# Patient Record
Sex: FEMALE | URBAN - METROPOLITAN AREA
[De-identification: names, ages, dates, MRNs, and addresses within clinical notes are randomized per-mention and may not be internally consistent; named-entity substitution may affect disease eponyms.]

---

## 2024-02-15 ENCOUNTER — APPOINTMENT (OUTPATIENT)
Dept: URBAN - METROPOLITAN AREA CLINIC 189 | Age: 69
Setting detail: DERMATOLOGY
End: 2024-02-19

## 2024-02-15 DIAGNOSIS — L20.89 OTHER ATOPIC DERMATITIS: ICD-10-CM

## 2024-02-15 PROCEDURE — OTHER COUNSELING: OTHER

## 2024-02-15 PROCEDURE — 99203 OFFICE O/P NEW LOW 30 MIN: CPT

## 2024-02-15 PROCEDURE — OTHER PRESCRIPTION: OTHER

## 2024-02-15 PROCEDURE — OTHER TREATMENT REGIMEN: OTHER

## 2024-02-15 PROCEDURE — OTHER MIPS QUALITY: OTHER

## 2024-02-15 RX ORDER — CLOBETASOL PROPIONATE 0.5 MG/ML
SOLUTION TOPICAL
Qty: 50 | Refills: 1 | Status: ERX | COMMUNITY
Start: 2024-02-15

## 2024-02-15 RX ORDER — CLOBETASOL PROPIONATE 0.5 MG/G
OINTMENT TOPICAL
Qty: 45 | Refills: 1 | Status: ERX | COMMUNITY
Start: 2024-02-15

## 2024-02-15 ASSESSMENT — LOCATION DETAILED DESCRIPTION DERM
LOCATION DETAILED: RIGHT RADIAL PALM
LOCATION DETAILED: LEFT RADIAL PALM

## 2024-02-15 ASSESSMENT — LOCATION ZONE DERM: LOCATION ZONE: HAND

## 2024-02-15 ASSESSMENT — LOCATION SIMPLE DESCRIPTION DERM
LOCATION SIMPLE: RIGHT HAND
LOCATION SIMPLE: LEFT HAND

## 2024-02-15 NOTE — PROCEDURE: MIPS QUALITY
Detail Level: Detailed
Quality 226: Preventive Care And Screening: Tobacco Use: Screening And Cessation Intervention: Patient screened for tobacco use and is an ex/non-smoker
Quality 47: Advance Care Plan: Advance Care Planning discussed and documented; advance care plan or surrogate decision maker documented in the medical record.
Quality 111:Pneumonia Vaccination Status For Older Adults: Patient received any pneumococcal conjugate or polysaccharide vaccine on or after their 60th birthday and before the end of the measurement period

## 2024-02-15 NOTE — PROCEDURE: TREATMENT REGIMEN
Otc Regimen: Use Cetaphil gentle cleanser or dove sensitive skin bar soap. Patient advised to avoid fragrances in soaps and lotions. Patient to moisturize after every hand washing. List of approved products provided.
Detail Level: Simple

## 2024-02-15 NOTE — HPI: ECZEMA (PATIENT REPORTED)
Where Is Your Eczema Located?: Both hands
List Moisturizers You Tried (Separate Each Name With A Comma):: Eucerin, CeraVe
List Prescription Topical Steroids You Tried (Separate Each Name With A Comma):: A steroid that she used for poison Ivy and for her eyebrows.
List Moisturizers You Are Currently Using (Separate Each Name With A Comma):: CeraVe for eczema
Additional Comments (Use Complete Sentences): Patient was diagnosed by her pcp but was never given a treatment for eczema.

## 2024-03-20 ENCOUNTER — APPOINTMENT (OUTPATIENT)
Dept: URBAN - METROPOLITAN AREA CLINIC 189 | Age: 69
Setting detail: DERMATOLOGY
End: 2024-03-20

## 2024-03-20 DIAGNOSIS — L20.89 OTHER ATOPIC DERMATITIS: ICD-10-CM

## 2024-03-20 PROCEDURE — OTHER TREATMENT REGIMEN: OTHER

## 2024-03-20 PROCEDURE — 99213 OFFICE O/P EST LOW 20 MIN: CPT

## 2024-03-20 PROCEDURE — OTHER MIPS QUALITY: OTHER

## 2024-03-20 PROCEDURE — OTHER COUNSELING: OTHER

## 2024-03-20 ASSESSMENT — LOCATION DETAILED DESCRIPTION DERM
LOCATION DETAILED: RIGHT RADIAL PALM
LOCATION DETAILED: LEFT RADIAL PALM

## 2024-03-20 ASSESSMENT — LOCATION SIMPLE DESCRIPTION DERM
LOCATION SIMPLE: RIGHT HAND
LOCATION SIMPLE: LEFT HAND

## 2024-03-20 ASSESSMENT — LOCATION ZONE DERM: LOCATION ZONE: HAND

## 2024-03-20 NOTE — PROCEDURE: TREATMENT REGIMEN
Continue Regimen: Clobetasol ointment and solution as previously directed
Detail Level: Simple
Otc Regimen: Try paraffin wax bath treatment three times weekly.\\nContinue fragrance free gentle soaps and moisturizer after hand washing.

## 2024-05-01 ENCOUNTER — APPOINTMENT (OUTPATIENT)
Dept: URBAN - METROPOLITAN AREA CLINIC 189 | Age: 69
Setting detail: DERMATOLOGY
End: 2024-05-01

## 2024-05-01 DIAGNOSIS — L20.89 OTHER ATOPIC DERMATITIS: ICD-10-CM

## 2024-05-01 PROBLEM — L30.9 DERMATITIS, UNSPECIFIED: Status: ACTIVE | Noted: 2024-05-01

## 2024-05-01 PROCEDURE — OTHER MIPS QUALITY: OTHER

## 2024-05-01 PROCEDURE — 99213 OFFICE O/P EST LOW 20 MIN: CPT

## 2024-05-01 PROCEDURE — OTHER TREATMENT REGIMEN: OTHER

## 2024-05-01 PROCEDURE — OTHER COUNSELING: OTHER

## 2024-05-01 ASSESSMENT — LOCATION SIMPLE DESCRIPTION DERM
LOCATION SIMPLE: PLANTAR SURFACE OF LEFT 1ST TOE
LOCATION SIMPLE: LEFT HAND
LOCATION SIMPLE: RIGHT HAND

## 2024-05-01 ASSESSMENT — LOCATION DETAILED DESCRIPTION DERM
LOCATION DETAILED: RIGHT RADIAL PALM
LOCATION DETAILED: LEFT RADIAL PALM
LOCATION DETAILED: LEFT LATERAL PLANTAR 1ST TOE

## 2024-05-01 ASSESSMENT — LOCATION ZONE DERM
LOCATION ZONE: TOE
LOCATION ZONE: HAND

## 2024-05-01 NOTE — PROCEDURE: TREATMENT REGIMEN
Plan: Vtama risks vs benefits discussed.
Samples Given: Cicaplast\\nVtama
Detail Level: Simple
Otc Regimen: Continue fragrance free gentle soaps and moisturizer after hand washing. Try moisturizing with Cicaplast.
Discontinue Regimen: Clobetasol ointment and solution
Initiate Treatment: Vtama-apply nightly specifically to affected areas until clear.

## 2024-06-05 ENCOUNTER — APPOINTMENT (OUTPATIENT)
Dept: URBAN - METROPOLITAN AREA CLINIC 189 | Age: 69
Setting detail: DERMATOLOGY
End: 2024-06-05

## 2024-06-05 DIAGNOSIS — L20.89 OTHER ATOPIC DERMATITIS: ICD-10-CM

## 2024-06-05 PROCEDURE — OTHER TREATMENT REGIMEN: OTHER

## 2024-06-05 PROCEDURE — OTHER COUNSELING: OTHER

## 2024-06-05 PROCEDURE — OTHER MIPS QUALITY: OTHER

## 2024-06-05 PROCEDURE — 99213 OFFICE O/P EST LOW 20 MIN: CPT

## 2024-06-05 ASSESSMENT — LOCATION ZONE DERM
LOCATION ZONE: TOE
LOCATION ZONE: FEET
LOCATION ZONE: HAND

## 2024-06-05 ASSESSMENT — ITCH NUMERIC RATING SCALE: HOW SEVERE IS YOUR ITCHING?: 3

## 2024-06-05 ASSESSMENT — LOCATION DETAILED DESCRIPTION DERM
LOCATION DETAILED: LEFT RADIAL PALM
LOCATION DETAILED: LEFT MEDIAL PLANTAR 1ST TOE
LOCATION DETAILED: RIGHT RADIAL PALM
LOCATION DETAILED: RIGHT MEDIAL PLANTAR HEEL
LOCATION DETAILED: LEFT MEDIAL PLANTAR HEEL

## 2024-06-05 ASSESSMENT — SEVERITY ASSESSMENT 2020: SEVERITY 2020: MODERATE

## 2024-06-05 ASSESSMENT — LOCATION SIMPLE DESCRIPTION DERM
LOCATION SIMPLE: RIGHT PLANTAR SURFACE
LOCATION SIMPLE: PLANTAR SURFACE OF LEFT 1ST TOE
LOCATION SIMPLE: LEFT HAND
LOCATION SIMPLE: LEFT PLANTAR SURFACE
LOCATION SIMPLE: RIGHT HAND

## 2024-06-05 ASSESSMENT — BSA ECZEMA: % BODY COVERED IN ECZEMA: 3

## 2024-06-05 NOTE — PROCEDURE: TREATMENT REGIMEN
Initiate Treatment: Vtama nightly followed by Clobetasol ointment.\\nClobetasol solution in the morning.
Otc Regimen: Continue frequent moisturizing
Plan: Hyperkeratotic Eczema vs. PS\\nI’m leaning more towards eczema and recommend trial of Dupixent due to more favorable safety profile and no lab monitoring. 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 \\n\\nDupixent enrollment form will be sent to [facility] and Dupixent [employer].
Detail Level: Simple

## 2024-08-14 ENCOUNTER — APPOINTMENT (OUTPATIENT)
Dept: URBAN - METROPOLITAN AREA CLINIC 189 | Age: 69
Setting detail: DERMATOLOGY
End: 2024-08-16

## 2024-08-14 DIAGNOSIS — L20.89 OTHER ATOPIC DERMATITIS: ICD-10-CM

## 2024-08-14 PROCEDURE — 99213 OFFICE O/P EST LOW 20 MIN: CPT | Mod: 25

## 2024-08-14 PROCEDURE — OTHER TREATMENT REGIMEN: OTHER

## 2024-08-14 PROCEDURE — OTHER DUPIXENT INJECTION: OTHER

## 2024-08-14 PROCEDURE — OTHER SEPARATE AND IDENTIFIABLE DOCUMENTATION: OTHER

## 2024-08-14 PROCEDURE — OTHER COUNSELING: OTHER

## 2024-08-14 PROCEDURE — OTHER MIPS QUALITY: OTHER

## 2024-08-14 PROCEDURE — 96372 THER/PROPH/DIAG INJ SC/IM: CPT

## 2024-08-14 ASSESSMENT — LOCATION SIMPLE DESCRIPTION DERM
LOCATION SIMPLE: RIGHT PLANTAR SURFACE
LOCATION SIMPLE: RIGHT HAND
LOCATION SIMPLE: LEFT PLANTAR SURFACE
LOCATION SIMPLE: LEFT HAND
LOCATION SIMPLE: PLANTAR SURFACE OF LEFT 1ST TOE
LOCATION SIMPLE: ABDOMEN

## 2024-08-14 ASSESSMENT — LOCATION DETAILED DESCRIPTION DERM
LOCATION DETAILED: RIGHT MEDIAL PLANTAR HEEL
LOCATION DETAILED: RIGHT RADIAL PALM
LOCATION DETAILED: LEFT MEDIAL PLANTAR 1ST TOE
LOCATION DETAILED: PERIUMBILICAL SKIN
LOCATION DETAILED: LEFT RADIAL PALM
LOCATION DETAILED: LEFT MEDIAL PLANTAR HEEL

## 2024-08-14 ASSESSMENT — LOCATION ZONE DERM
LOCATION ZONE: TOE
LOCATION ZONE: HAND
LOCATION ZONE: FEET
LOCATION ZONE: TRUNK

## 2024-08-14 ASSESSMENT — SEVERITY ASSESSMENT 2020: SEVERITY 2020: MODERATE

## 2024-08-14 ASSESSMENT — BSA ECZEMA: % BODY COVERED IN ECZEMA: 3

## 2024-08-14 ASSESSMENT — ITCH NUMERIC RATING SCALE: HOW SEVERE IS YOUR ITCHING?: 3

## 2024-08-14 NOTE — PROCEDURE: TREATMENT REGIMEN
Continue Regimen: Vtama and Clobetasol ointment as previously directed.
Otc Regimen: Continue frequent moisturizing
Plan: (Hyperkeratotic Eczema vs. PS. I’m leaning more towards eczema and recommend trial of Dupixent due to more favorable safety profile and no lab monitoring. Small BSA but special site with hands and feet and causes patient pain.)
Detail Level: Simple
Detail Level: Zone
Plan: PATIENT PRESENTED TODAY WITH 1 300 mg SYRINGE OF DUPIXENT IN A PLASTIC BAG. SHE WASNT AWARE SHE WAS SUPPOSED TO BRING THE BOX. PER PATIENT SHE LIVES 15 min AWAY. INJECTION TRAINING WAS PROVIDED UTILIZING THE 1 SYRINGE AND PATIENT WAS INSTRUCTED TO GO HOME RIGHT AFTER THIS APPOINTMENT AND INJECT THE OTHER SYRINGE SO HER TOTAL DOSE FOR TODAY  mg/2 SYRINGES. PATIENT WAS ALSO OFFERED TO RETURN TO OFFICE BUT SHE STATED INJECTION PROCESS WAS EASY AND SHE IS OK DOING IT AT HOME. PATIENT MADE AWARE THAT AFTER TODAY HER NEXT DOSE WILL BE IN 2 WEEKS/DAY 15 OF JUST 1 300 mg SYRINGE. \\nPatient was advised to go home and email picture showing lot number and expiration date. Patient was given sharps container.

## 2024-08-14 NOTE — PROCEDURE: DUPIXENT INJECTION
Administered By (Optional): BRIANNA Francis
Ndc (200 Mg Prefilled Pen): 3427-6102-86
Other Time Frame Value: in the next 30 min
Consent: The risks of pain and injection site reactions were reviewed with the patient prior to the injection.
Render If Medication Purchased By Clinic In Visit Note?: Yes
Ndc (200 Mg Prefilled Syringe): [address]-90
Was The Medication Purchased By The Clinic?: No
Date Of Next Injection: Other Time Frame (Enter Below)
J-Code: 
Syringe Size Used (Required For Enhanced Ndc): 300 mg/2ml prefilled pen
Ndc (300 Mg Prefilled Syringe): [address]-17
Ndc (300 Mg Prefilled Pen): [address]-26
[id number] Billing Preferences: 1
Dupixent Dosing: 300 mg
Detail Level: Simple

## 2024-11-20 ENCOUNTER — APPOINTMENT (OUTPATIENT)
Dept: URBAN - METROPOLITAN AREA CLINIC 189 | Age: 69
Setting detail: DERMATOLOGY
End: 2024-11-20

## 2024-11-20 DIAGNOSIS — L20.89 OTHER ATOPIC DERMATITIS: ICD-10-CM

## 2024-11-20 PROCEDURE — 99213 OFFICE O/P EST LOW 20 MIN: CPT

## 2024-11-20 PROCEDURE — OTHER PRESCRIPTION: OTHER

## 2024-11-20 PROCEDURE — OTHER MIPS QUALITY: OTHER

## 2024-11-20 PROCEDURE — OTHER TREATMENT REGIMEN: OTHER

## 2024-11-20 PROCEDURE — OTHER COUNSELING: OTHER

## 2024-11-20 RX ORDER — DUPILUMAB 300 MG/2ML
INJECTION, SOLUTION SUBCUTANEOUS
Qty: 4 | Refills: 5 | Status: ERX | COMMUNITY
Start: 2024-11-20

## 2024-11-20 ASSESSMENT — LOCATION ZONE DERM
LOCATION ZONE: FEET
LOCATION ZONE: TOE
LOCATION ZONE: HAND

## 2024-11-20 ASSESSMENT — LOCATION DETAILED DESCRIPTION DERM
LOCATION DETAILED: RIGHT MEDIAL PLANTAR HEEL
LOCATION DETAILED: LEFT MEDIAL PLANTAR HEEL
LOCATION DETAILED: LEFT RADIAL PALM
LOCATION DETAILED: RIGHT RADIAL PALM
LOCATION DETAILED: LEFT MEDIAL PLANTAR 1ST TOE

## 2024-11-20 ASSESSMENT — LOCATION SIMPLE DESCRIPTION DERM
LOCATION SIMPLE: RIGHT PLANTAR SURFACE
LOCATION SIMPLE: RIGHT HAND
LOCATION SIMPLE: LEFT PLANTAR SURFACE
LOCATION SIMPLE: LEFT HAND
LOCATION SIMPLE: PLANTAR SURFACE OF LEFT 1ST TOE

## 2024-11-20 ASSESSMENT — ITCH NUMERIC RATING SCALE: HOW SEVERE IS YOUR ITCHING?: 0

## 2024-11-20 ASSESSMENT — SEVERITY ASSESSMENT 2020: SEVERITY 2020: CLEAR

## 2024-11-20 ASSESSMENT — BSA ECZEMA: % BODY COVERED IN ECZEMA: 0

## 2024-11-20 NOTE — PROCEDURE: TREATMENT REGIMEN
Continue Regimen: Dupixent 300 mg inj every other week
Discontinue Regimen: Vtama and Clobetasol. Pt states hasn’t used and doesn’t need them anymore.
Detail Level: Simple

## 2025-05-28 ENCOUNTER — APPOINTMENT (OUTPATIENT)
Dept: URBAN - METROPOLITAN AREA CLINIC 189 | Age: 70
Setting detail: DERMATOLOGY
End: 2025-05-28

## 2025-05-28 ENCOUNTER — RX ONLY (RX ONLY)
Age: 70
End: 2025-05-28

## 2025-05-28 DIAGNOSIS — L20.89 OTHER ATOPIC DERMATITIS: ICD-10-CM

## 2025-05-28 PROCEDURE — OTHER MIPS QUALITY: OTHER

## 2025-05-28 PROCEDURE — 99213 OFFICE O/P EST LOW 20 MIN: CPT

## 2025-05-28 PROCEDURE — OTHER COUNSELING: OTHER

## 2025-05-28 PROCEDURE — OTHER TREATMENT REGIMEN: OTHER

## 2025-05-28 RX ORDER — DUPILUMAB 300 MG/2ML
INJECTION, SOLUTION SUBCUTANEOUS
Qty: 4 | Refills: 5 | Status: ERX

## 2025-05-28 ASSESSMENT — LOCATION DETAILED DESCRIPTION DERM
LOCATION DETAILED: RIGHT MEDIAL PLANTAR HEEL
LOCATION DETAILED: LEFT MEDIAL PLANTAR HEEL
LOCATION DETAILED: RIGHT RADIAL PALM
LOCATION DETAILED: LEFT MEDIAL PLANTAR 1ST TOE
LOCATION DETAILED: LEFT RADIAL PALM

## 2025-05-28 ASSESSMENT — BSA ECZEMA: % BODY COVERED IN ECZEMA: 0

## 2025-05-28 ASSESSMENT — LOCATION ZONE DERM
LOCATION ZONE: HAND
LOCATION ZONE: TOE
LOCATION ZONE: FEET

## 2025-05-28 ASSESSMENT — LOCATION SIMPLE DESCRIPTION DERM
LOCATION SIMPLE: LEFT HAND
LOCATION SIMPLE: LEFT PLANTAR SURFACE
LOCATION SIMPLE: PLANTAR SURFACE OF LEFT 1ST TOE
LOCATION SIMPLE: RIGHT PLANTAR SURFACE
LOCATION SIMPLE: RIGHT HAND

## 2025-05-28 ASSESSMENT — SEVERITY ASSESSMENT 2020: SEVERITY 2020: CLEAR

## 2025-05-28 ASSESSMENT — ITCH NUMERIC RATING SCALE: HOW SEVERE IS YOUR ITCHING?: 0
